# Patient Record
Sex: FEMALE | Race: BLACK OR AFRICAN AMERICAN | NOT HISPANIC OR LATINO | Employment: FULL TIME | URBAN - METROPOLITAN AREA
[De-identification: names, ages, dates, MRNs, and addresses within clinical notes are randomized per-mention and may not be internally consistent; named-entity substitution may affect disease eponyms.]

---

## 2017-07-19 ENCOUNTER — HOSPITAL ENCOUNTER (EMERGENCY)
Facility: HOSPITAL | Age: 47
Discharge: HOME | End: 2017-07-19
Attending: EMERGENCY MEDICINE
Payer: COMMERCIAL

## 2017-07-19 VITALS
HEART RATE: 57 BPM | RESPIRATION RATE: 17 BRPM | TEMPERATURE: 99 F | HEIGHT: 66 IN | OXYGEN SATURATION: 100 % | WEIGHT: 285 LBS | DIASTOLIC BLOOD PRESSURE: 65 MMHG | BODY MASS INDEX: 45.8 KG/M2 | SYSTOLIC BLOOD PRESSURE: 147 MMHG

## 2017-07-19 DIAGNOSIS — R07.89 CHEST WALL PAIN: Primary | ICD-10-CM

## 2017-07-19 DIAGNOSIS — R07.9 CHEST PAIN: ICD-10-CM

## 2017-07-19 LAB
ALBUMIN SERPL BCP-MCNC: 3.3 G/DL
ALP SERPL-CCNC: 79 U/L
ALT SERPL W/O P-5'-P-CCNC: 13 U/L
ANION GAP SERPL CALC-SCNC: 9 MMOL/L
AST SERPL-CCNC: 13 U/L
BASOPHILS # BLD AUTO: 0.03 K/UL
BASOPHILS NFR BLD: 0.3 %
BILIRUB SERPL-MCNC: 0.2 MG/DL
BILIRUB UR QL STRIP: NEGATIVE
BUN SERPL-MCNC: 16 MG/DL
CALCIUM SERPL-MCNC: 9 MG/DL
CHLORIDE SERPL-SCNC: 108 MMOL/L
CLARITY UR REFRACT.AUTO: CLEAR
CO2 SERPL-SCNC: 22 MMOL/L
COLOR UR AUTO: YELLOW
CREAT SERPL-MCNC: 0.8 MG/DL
DIFFERENTIAL METHOD: ABNORMAL
EOSINOPHIL # BLD AUTO: 0.1 K/UL
EOSINOPHIL NFR BLD: 1.5 %
ERYTHROCYTE [DISTWIDTH] IN BLOOD BY AUTOMATED COUNT: 13 %
EST. GFR  (AFRICAN AMERICAN): >60 ML/MIN/1.73 M^2
EST. GFR  (NON AFRICAN AMERICAN): >60 ML/MIN/1.73 M^2
GLUCOSE SERPL-MCNC: 102 MG/DL
GLUCOSE UR QL STRIP: NEGATIVE
HCT VFR BLD AUTO: 35.3 %
HGB BLD-MCNC: 11.8 G/DL
HGB UR QL STRIP: NEGATIVE
KETONES UR QL STRIP: NEGATIVE
LEUKOCYTE ESTERASE UR QL STRIP: NEGATIVE
LYMPHOCYTES # BLD AUTO: 3.3 K/UL
LYMPHOCYTES NFR BLD: 35.3 %
MCH RBC QN AUTO: 29.4 PG
MCHC RBC AUTO-ENTMCNC: 33.4 %
MCV RBC AUTO: 88 FL
MONOCYTES # BLD AUTO: 0.5 K/UL
MONOCYTES NFR BLD: 5.3 %
NEUTROPHILS # BLD AUTO: 5.4 K/UL
NEUTROPHILS NFR BLD: 57.5 %
NITRITE UR QL STRIP: NEGATIVE
PH UR STRIP: 7 [PH] (ref 5–8)
PLATELET # BLD AUTO: 290 K/UL
PMV BLD AUTO: 9 FL
POTASSIUM SERPL-SCNC: 3.8 MMOL/L
PROT SERPL-MCNC: 7.6 G/DL
PROT UR QL STRIP: NEGATIVE
RBC # BLD AUTO: 4.02 M/UL
SODIUM SERPL-SCNC: 139 MMOL/L
SP GR UR STRIP: 1.02 (ref 1–1.03)
TROPONIN I SERPL DL<=0.01 NG/ML-MCNC: <0.006 NG/ML
URN SPEC COLLECT METH UR: NORMAL
UROBILINOGEN UR STRIP-ACNC: NEGATIVE EU/DL
WBC # BLD AUTO: 9.3 K/UL

## 2017-07-19 PROCEDURE — 25000003 PHARM REV CODE 250: Performed by: PHYSICIAN ASSISTANT

## 2017-07-19 PROCEDURE — 96374 THER/PROPH/DIAG INJ IV PUSH: CPT

## 2017-07-19 PROCEDURE — 63600175 PHARM REV CODE 636 W HCPCS: Performed by: PHYSICIAN ASSISTANT

## 2017-07-19 PROCEDURE — 93005 ELECTROCARDIOGRAM TRACING: CPT

## 2017-07-19 PROCEDURE — 84484 ASSAY OF TROPONIN QUANT: CPT

## 2017-07-19 PROCEDURE — 81003 URINALYSIS AUTO W/O SCOPE: CPT

## 2017-07-19 PROCEDURE — 80053 COMPREHEN METABOLIC PANEL: CPT

## 2017-07-19 PROCEDURE — 93010 ELECTROCARDIOGRAM REPORT: CPT | Mod: ,,, | Performed by: INTERNAL MEDICINE

## 2017-07-19 PROCEDURE — 99284 EMERGENCY DEPT VISIT MOD MDM: CPT | Mod: 25

## 2017-07-19 PROCEDURE — 85025 COMPLETE CBC W/AUTO DIFF WBC: CPT

## 2017-07-19 PROCEDURE — 99285 EMERGENCY DEPT VISIT HI MDM: CPT | Mod: ,,, | Performed by: PHYSICIAN ASSISTANT

## 2017-07-19 RX ORDER — KETOROLAC TROMETHAMINE 30 MG/ML
10 INJECTION, SOLUTION INTRAMUSCULAR; INTRAVENOUS
Status: COMPLETED | OUTPATIENT
Start: 2017-07-19 | End: 2017-07-19

## 2017-07-19 RX ORDER — NAPROXEN 500 MG/1
500 TABLET ORAL 2 TIMES DAILY
Qty: 30 TABLET | Refills: 0 | Status: SHIPPED | OUTPATIENT
Start: 2017-07-19

## 2017-07-19 RX ADMIN — ALUMINUM HYDROXIDE, MAGNESIUM HYDROXIDE, AND SIMETHICONE 50 ML: 200; 200; 20 SUSPENSION ORAL at 02:07

## 2017-07-19 RX ADMIN — KETOROLAC TROMETHAMINE 10 MG: 30 INJECTION, SOLUTION INTRAMUSCULAR at 03:07

## 2017-07-19 NOTE — DISCHARGE INSTRUCTIONS
Take naproxen twice daily as needed for chest wall pain  Followup with PCP  Return to the ED for any new symptoms

## 2017-07-19 NOTE — PROVIDER PROGRESS NOTES - EMERGENCY DEPT.
Encounter Date: 7/19/2017    ED Physician Progress Notes         EKG - STEMI Decision  Initial Reading: No STEMI present.  Response: No Action Needed.

## 2017-07-19 NOTE — ED PROVIDER NOTES
Encounter Date: 2017       History     Chief Complaint   Patient presents with    Chest Pain     Left sided since this am     Morbidly obese left side of her chest the past 36 hours.  Pain was acute in onset when she woke up yesterday.  The pain is nonradiating and described as pulsating patient feels like the pain is coming from the left side her breast. Pain is worse with twisting of her torso and improved she supports her left breast.  She has not tried to take any medication for this pain.  She has never had this type of pain before.  She denies any trauma or injury.  She has no shortness of breath fever or chills.          Review of patient's allergies indicates:  No Known Allergies  Past Medical History:   Diagnosis Date    Varicose veins     wihtout ulcers     Past Surgical History:   Procedure Laterality Date     SECTION      x 3     Family History   Problem Relation Age of Onset    Diabetes Mother     Diabetes Father     Kidney disease Sister     Kidney disease Brother      Social History   Substance Use Topics    Smoking status: Never Smoker    Smokeless tobacco: Not on file    Alcohol use Yes     Review of Systems   Constitutional: Negative for fever.   HENT: Negative for sore throat.    Respiratory: Negative for shortness of breath.    Cardiovascular: Positive for chest pain.   Gastrointestinal: Negative for nausea and vomiting.   Genitourinary: Negative for dysuria.   Musculoskeletal: Negative for back pain.   Skin: Negative for rash.   Neurological: Negative for weakness.   Hematological: Does not bruise/bleed easily.       Physical Exam     Initial Vitals [17 0200]   BP Pulse Resp Temp SpO2   (!) 175/80 75 18 98.7 °F (37.1 °C) 97 %      MAP       111.67         Physical Exam    Constitutional: Vital signs are normal. She appears well-developed and well-nourished. She is not diaphoretic. No distress.   HENT:   Head: Normocephalic and atraumatic.   Right Ear: External ear  normal.   Left Ear: External ear normal.   Eyes: Conjunctivae are normal.   Cardiovascular: Normal rate and regular rhythm. Exam reveals no gallop and no friction rub.    No murmur heard.  Pulmonary/Chest: Breath sounds normal. No respiratory distress. She has no wheezes. She has no rhonchi. She has no rales. She exhibits tenderness.   Pain is reproduced when I press on the left lateral chest wall.   Mild epigastric tenderness on exam   Abdominal: Soft. Normal appearance, normal aorta and bowel sounds are normal. She exhibits no distension and no mass. There is no tenderness. There is no rebound and no guarding.   Musculoskeletal: Normal range of motion.   Neurological: She is alert and oriented to person, place, and time.   Skin: Skin is warm and intact.   Psychiatric: She has a normal mood and affect. Her speech is normal and behavior is normal. Cognition and memory are normal.         ED Course   Procedures  Labs Reviewed   CBC W/ AUTO DIFFERENTIAL - Abnormal; Notable for the following:        Result Value    Hemoglobin 11.8 (*)     Hematocrit 35.3 (*)     MPV 9.0 (*)     All other components within normal limits   COMPREHENSIVE METABOLIC PANEL - Abnormal; Notable for the following:     CO2 22 (*)     Albumin 3.3 (*)     All other components within normal limits   URINALYSIS   TROPONIN I     EKG Readings: (Independently Interpreted)   Initial Reading: No STEMI.   EKG: Normal sinus at 71 with no ST elevations or depressions.  No intervals, normal axis.       X-Rays:   Independently Interpreted Readings:   Other Readings:  Chest x-ray: No acute process    Medical Decision Making:   History:   Old Medical Records: I decided to obtain old medical records.  Old Records Summarized: records from previous admission(s).  Differential Diagnosis:   ACS, MI, pneumonia, muscle strain, costochondritis  Clinical Tests:   Lab Tests: Ordered and Reviewed  Radiological Study: Ordered and Reviewed  Medical Tests: Ordered and  Reviewed  ED Management:  47-year-old female with chest wall pain.  Pain improved with Toradol.  Cardiac evaluation in the ER reveals no acute findings.  Patient's pain has been present for greater than 36 hours.  Patient will be discharged home to follow-up in clinic with anti-inflammatory medications.  Return instructions given              Attending Attestation:     Physician Attestation Statement for NP/PA:   I discussed this assessment and plan of this patient with the NP/PA, but I did not personally examine the patient. The face to face encounter was performed by the NP/PA.    Other NP/PA Attestation Additions:      Medical Decision Makin-year-old female presenting with left-sided chest pain.  Pain is reproducible.  Initial EKG and troponin negative.  I considered cardiac etiologies, but the patient's symptoms have been persistent for over 36 hours with negative workup at this time.  She'll be discharged to follow-up with PCP for further evaluation                 ED Course     Clinical Impression:   The primary encounter diagnosis was Chest wall pain. A diagnosis of Chest pain was also pertinent to this visit.    Disposition:   Disposition: Discharged  Condition: Stable                        Joey Kunz PA-C  [id number]       Molly Mccarthy MD  [id number]       Molly Mccarthy MD  [id number]

## 2020-03-17 ENCOUNTER — CLINICAL SUPPORT (OUTPATIENT)
Dept: REHABILITATION | Facility: HOSPITAL | Age: 50
End: 2020-03-17
Payer: COMMERCIAL

## 2020-03-17 DIAGNOSIS — S92.215A CLOSED NONDISPLACED FRACTURE OF CUBOID BONE OF LEFT FOOT: ICD-10-CM

## 2020-03-17 DIAGNOSIS — R52 PAIN: ICD-10-CM

## 2020-03-17 DIAGNOSIS — S92.215S CLOSED NONDISPLACED FRACTURE OF CUBOID OF LEFT FOOT, SEQUELA: ICD-10-CM

## 2020-03-17 PROCEDURE — 97110 THERAPEUTIC EXERCISES: CPT | Performed by: PHYSICAL THERAPIST

## 2020-03-17 PROCEDURE — 97161 PT EVAL LOW COMPLEX 20 MIN: CPT | Performed by: PHYSICAL THERAPIST
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
## 2020-03-17 NOTE — PATIENT INSTRUCTIONS
Strengthening: Quadriceps Set    Tighten muscles on top of thighs by pushing knees down into surface. Hold 5 seconds.  Repeat 25 times . R/L or BL  leg per set. Do 1 sets per session. Do 2 sessions per day.      Straight Leg Raise     With R/L or BL leg straight, other leg bent, raise straight leg until knees are even. Slowly lower. Roll on your side and repeat lifting top leg up, on other side, lifting bottom leg up, and on stomach kicking back (squeezing your rear end before you kick back).  Repeat 25 times. Do 1 sets per session. Do 2 sessions per day.       Glute Squeeze, supine    Lie face up and squeeze your rear end. Do not tighten your abdominals or hold your breath. Squeeze our glutes and hold 5 seconds. Relax.  Repeat 25 times per set. Do 1 sets per session. Do 2 sessions per day.      Strengthening: Hip Adduction - Isometric    Sit back or lie down with ball or folded pillow between knees, and squeeze knees together. Hold 5 seconds.  Repeat 25 times per set. Do 1 sets per session. Do 2 sessions per day.        Hamstring Curl        Perform 25 times, 2x per day. (HEP to GO)        Ankle Pump    With affected leg elevated, gently flex and extend ankle. Move through full range of motion. Avoid pain. Perform more frequently if having increased swelling.  Repeat 25 times. Do 1 set per session. Do 2 sessions per day.      Ankle Alphabet    Sit with leg straight out in front of you and heel off edge of bed or propped up on towel roll. Using ankle and foot only, trace the letters of the alphabet. Perform A to Z. Do not let the knee move too much side to side.  Repeat 1 times on affected side Do 2 sessions per day.      Towel Scrunches    Place affected foot flat on towel, knee pointed forward. Use forefoot and toes to pull towel backward.  Do not allow heel or knee to move. Repetitions should be slow and controlled.  Repeat 25 times Do 1 set per session. Do 2 sessions per day.      Gastroc, Sitting (Passive)      Sit with leg straight out in front of you and a towel under your heel and around ball of foot. Gently pull toward body. Hold 30 seconds.   Repeat 5 times. Do 1 sets per session. Do 2 sessions per day.        Resistance Band Ankle Movements, 4 ways    1. Wrap band around foot and attach below the foot. Pull foot up against resistance band. Slowly release for 3-5 seconds.  2. Wrap band around foot and hold band in your hand. Push foot down against resistance band. Slowly release for 3-5 seconds.  3. Wrap band around foot and loop around other foot and hold the end of the band. Pull foot out to side against resistance band. Slowly release for 3-5 seconds.   4. Cross one leg over the other, wrap band around bottom foot, step top of foot on band and hold the end of the band. Pull foot to the inside against resistance band. Slowly release for 3-5 seconds.     Use  resistance band.  Repeat 25 times Do 1 set per session. Do 2 sessions per day.

## 2020-03-18 ENCOUNTER — TELEPHONE (OUTPATIENT)
Dept: REHABILITATION | Facility: HOSPITAL | Age: 50
End: 2020-03-18

## 2020-03-18 NOTE — TELEPHONE ENCOUNTER
Spoke with patient due to therapy following updates regarding COVID-19 closely and taking every precaution to ensure the safety of our patients, staff and community. In an abundance of caution and in an effort to help reduce risk and limit community spread, we have decided to temporarily postpone appointments for patients who may be at increased risk to attend in-person therapy or where therapy is not critically needed at this time. Plan of care and home exercise program were reviewed and patient has what they need to continue therapy at home. All patient questions were answered. Also stated to patient that we are exploring virtual methods of providing care and will be in touch over the next few weeks. Patient verbalized understanding to all.

## 2020-07-14 ENCOUNTER — CLINICAL SUPPORT (OUTPATIENT)
Dept: REHABILITATION | Facility: HOSPITAL | Age: 50
End: 2020-07-14
Payer: COMMERCIAL

## 2020-07-14 DIAGNOSIS — R52 PAIN: ICD-10-CM

## 2020-07-14 PROCEDURE — 97110 THERAPEUTIC EXERCISES: CPT | Performed by: PHYSICAL THERAPIST

## 2020-07-14 PROCEDURE — 97164 PT RE-EVAL EST PLAN CARE: CPT | Mod: 59 | Performed by: PHYSICAL THERAPIST

## 2020-07-14 PROCEDURE — 97140 MANUAL THERAPY 1/> REGIONS: CPT | Performed by: PHYSICAL THERAPIST

## 2020-07-14 NOTE — PROGRESS NOTES
Physical Therapy Daily Treatment Note     Name: Kathy Kennedy [facility] Number: [id number]    Therapy Diagnosis:   Encounter Diagnosis   Name Primary?    Pain      Physician: Ed Armstrong MD    Visit Date: 7/14/2020  Physician Orders: PT Eval and Treat   Medical Diagnosis: S92.215A (ICD-10-CM) - Closed nondisplaced fracture of cuboid bone of left foot   Evaluation Date: 3/17/2020  Authorization Period Expiration: 3-09-21  Plan of Care Certification Period: 10-30-20  Visit # / Visits authorized: 2/ 30    Time In: 2:50 pm  Time Out: 3:35 pm  Total Billable Time: 40 minutes    Precautions: Standard    Subjective     Pt reports: stopped PT due to pandemic.  Pt c/o ankle/foot pain with walking.  States doing HEP as instructed..  She was compliant with home exercise program.  Response to previous treatment: na  Functional change: na    Pain: 5/10  Location: left ankle     Objective     Gait is independent with a mild limp.  Flat feel B.  Ankle AROM is 5 deg DF, 45 PF, 15 inv and 10 ev.  Mild swelling ankle.  No TTP.  Gross strength is 4/5 ankle and hip abductors.    Kathy received therapeutic exercises to develop strength, ROM and flexibility for 30 minutes including:  HP x 7 min  MOBS/PROM x 8 min  GTB x 4 ways  heelcord stretch  HEP review  CP x 10 min      Home Exercises Provided and Patient Education Provided     Education provided:   - ice for pain/swelling    Written Home Exercises Provided: Patient instructed to cont prior HEP.  Exercises were reviewed and Kathy was able to demonstrate them prior to the end of the session.  Kathy demonstrated good  understanding of the education provided.     See EMR under Media for exercises provided prior visit.    Assessment     Tolerated treatment well.  Feel pt being obese is contributing to continued swelling.  Kathy is progressing well towards her goals.   Pt prognosis is Fair.     Pt will continue to benefit from skilled outpatient physical therapy to address the  deficits listed in the problem list box on initial evaluation, provide pt/family education and to maximize pt's level of independence in the home and community environment.     Pt's spiritual, cultural and educational needs considered and pt agreeable to plan of care and goals.    Anticipated barriers to physical therapy: none    Goals: Short Term Goals: 2 weeks         1.   Independent with HEP        2.  Pt will report decreased pain level of < 50% from above measure with ADL     Long Term Goals:   GOALS:    8_   weeks. Pt agrees with goals set.  1. Independent with HEP.  2. Report decreased    L ankle    pain  <   / =  3/10 with ADL such as walking  3. Increased MMT  for  L LE to 5/5  with ADL such as housework  4. Increased arom  for  L ankle to WFL-WNL with functional activities such walking or self-care      Plan     Continue PT per POC.    Tyler Andre, PT

## 2020-08-17 ENCOUNTER — CLINICAL SUPPORT (OUTPATIENT)
Dept: REHABILITATION | Facility: HOSPITAL | Age: 50
End: 2020-08-17
Attending: ORTHOPAEDIC SURGERY
Payer: COMMERCIAL

## 2020-08-17 DIAGNOSIS — R52 PAIN: ICD-10-CM

## 2020-08-17 PROCEDURE — 97110 THERAPEUTIC EXERCISES: CPT | Performed by: PHYSICAL THERAPIST

## 2020-08-17 NOTE — PROGRESS NOTES
Physical Therapy Daily Treatment Note     Name: Kathy Kennedy [facility] Number: [id number]    Therapy Diagnosis:   Encounter Diagnosis   Name Primary?    Pain      Physician: Ed Armstrong MD    Visit Date: 8/17/2020  Physician Orders: PT Eval and Treat   Medical Diagnosis: S92.215A (ICD-10-CM) - Closed nondisplaced fracture of cuboid bone of left foot   Evaluation Date: 3/17/2020  Authorization Period Expiration: 3-09-21  Plan of Care Certification Period: 10-30-20  Visit # / Visits authorized: 4/ 30    Time In: 1:30 pm  Time Out: 2:25 pm  Total Billable Time: 40 minutes    Precautions: Standard    Subjective     Pt reports: L ankle feels OK, pain with walking  She was compliant with home exercise program.  Response to previous treatment: na  Functional change: na    Pain: 4/10  Location: left ankle     Objective     Gait is independent with a mild limp.  Flat feel B.  Ankle AROM is 5 deg DF, 45 PF, 15 inv and 10 ev.  Mild swelling ankle.  No TTP.  Gross strength is 4/5 ankle and hip abductors.    Kathy received therapeutic exercises to develop strength, ROM and flexibility for 40 minutes including:  Bike x 7 min  MOBS/PROM x 8 min  GTB x 4 ways  heelcord stretch  Standing hip abd and ext with 3#  pilates [place] abd/add  HEP review  CP x 10 min      Home Exercises Provided and Patient Education Provided     Education provided:   - ice for pain/swelling    Written Home Exercises Provided: Patient instructed to cont prior HEP.  Exercises were reviewed and Kathy was able to demonstrate them prior to the end of the session.  Kathy demonstrated good  understanding of the education provided.     See EMR under Media for exercises provided prior visit.    Assessment     Tolerated treatment well.    Kathy is progressing well towards her goals.   Pt prognosis is Fair.     Pt will continue to benefit from skilled outpatient physical therapy to address the deficits listed in the problem list box on initial evaluation,  provide pt/family education and to maximize pt's level of independence in the home and community environment.     Pt's spiritual, cultural and educational needs considered and pt agreeable to plan of care and goals.    Anticipated barriers to physical therapy: none    Goals: Short Term Goals: 2 weeks         1.   Independent with HEP        2.  Pt will report decreased pain level of < 50% from above measure with ADL     Long Term Goals:   GOALS:    8_   weeks. Pt agrees with goals set.  1. Independent with HEP.  2. Report decreased    L ankle    pain  <   / =  3/10 with ADL such as walking  3. Increased MMT  for  L LE to 5/5  with ADL such as housework  4. Increased arom  for  L ankle to WFL-WNL with functional activities such walking or self-care      Plan     Continue PT per POC.    Tyler Andre, PT

## 2020-08-19 ENCOUNTER — CLINICAL SUPPORT (OUTPATIENT)
Dept: REHABILITATION | Facility: HOSPITAL | Age: 50
End: 2020-08-19
Payer: COMMERCIAL

## 2020-08-19 DIAGNOSIS — R52 PAIN: ICD-10-CM

## 2020-08-19 PROCEDURE — 97110 THERAPEUTIC EXERCISES: CPT | Performed by: PHYSICAL THERAPIST

## 2020-08-19 NOTE — PROGRESS NOTES
Physical Therapy Daily Treatment Note     Name: Kathy Kennedy [facility] Number: [id number]    Therapy Diagnosis:   Encounter Diagnosis   Name Primary?    Pain      Physician: Ed Armstrong MD    Visit Date: 8/19/2020  Physician Orders: PT Eval and Treat   Medical Diagnosis: S92.215A (ICD-10-CM) - Closed nondisplaced fracture of cuboid bone of left foot   Evaluation Date: 3/17/2020  Authorization Period Expiration: 3-09-21  Plan of Care Certification Period: 10-30-20  Visit # / Visits authorized: 5/ 30    Time In: 5:10 pm  Time Out: 6:00 pm  Total Billable Time: 40 minutes    Precautions: Standard    Subjective     Pt reports: L ankle/walk feel better  She was compliant with home exercise program.  Response to previous treatment: na  Functional change: na    Pain: 2/10  Location: left ankle     Objective     Gait is independent with a MIN limp.  Ankle AROM is 5 deg DF, 45 PF, 15 inv and 10 ev.  Mild swelling ankle.  No TTP.  Gross strength is 4/5 ankle and hip abductors.    Kathy received therapeutic exercises to develop strength, ROM and flexibility for 40 minutes including:  Bike x 7 min  MOBS/PROM x 8 min  GTB x 4 ways  heelcord stretch  Standing hip abd and ext with 3#  pilates [place] abd/add  Shuttle toe raises with 2 bands  HEP review  CP x 10 min      Home Exercises Provided and Patient Education Provided     Education provided:   - ice for pain/swelling    Written Home Exercises Provided: Patient instructed to cont prior HEP.  Exercises were reviewed and Kathy was able to demonstrate them prior to the end of the session.  Kathy demonstrated good  understanding of the education provided.     See EMR under Media for exercises provided prior visit.    Assessment     Tolerated treatment well.  No pain with treatment.  Kathy is progressing well towards her goals.   Pt prognosis is Fair.     Pt will continue to benefit from skilled outpatient physical therapy to address the deficits listed in the problem list  box on initial evaluation, provide pt/family education and to maximize pt's level of independence in the home and community environment.     Pt's spiritual, cultural and educational needs considered and pt agreeable to plan of care and goals.    Anticipated barriers to physical therapy: none    Goals: Short Term Goals: 2 weeks         1.   Independent with HEP        2.  Pt will report decreased pain level of < 50% from above measure with ADL     Long Term Goals:   GOALS:    8_   weeks. Pt agrees with goals set.  1. Independent with HEP.  2. Report decreased    L ankle    pain  <   / =  3/10 with ADL such as walking  3. Increased MMT  for  L LE to 5/5  with ADL such as housework  4. Increased arom  for  L ankle to WFL-WNL with functional activities such walking or self-care      Plan     Continue PT per POC.    Tyler Andre, PT

## 2020-08-25 ENCOUNTER — CLINICAL SUPPORT (OUTPATIENT)
Dept: REHABILITATION | Facility: HOSPITAL | Age: 50
End: 2020-08-25
Payer: COMMERCIAL

## 2020-08-25 DIAGNOSIS — R52 PAIN: ICD-10-CM

## 2020-08-25 PROCEDURE — 97110 THERAPEUTIC EXERCISES: CPT | Performed by: PHYSICAL THERAPIST

## 2020-08-25 NOTE — PROGRESS NOTES
Physical Therapy Daily Treatment Note     Name: Kathy Kennedy [facility] Number: [id number]    Therapy Diagnosis:   Encounter Diagnosis   Name Primary?    Pain      Physician: Ed Armstrong MD    Visit Date: 8/25/2020  Physician Orders: PT Eval and Treat   Medical Diagnosis: S92.215A (ICD-10-CM) - Closed nondisplaced fracture of cuboid bone of left foot   Evaluation Date: 3/17/2020  Authorization Period Expiration: 3-09-21  Plan of Care Certification Period: 10-30-20  Visit # / Visits authorized: 6/ 30    Time In: 10:45 am  Time Out: 11:35 am  Total Billable Time: 40 minutes    Precautions: Standard    Subjective     Pt reports: L ankle/walk feel sore today after walking a lot in [place] yesterday  She was compliant with home exercise program.  Response to previous treatment: less pain  Functional change: improved gait    Pain: 2/10  Location: left ankle     Objective     Gait is independent with a MIN limp.  Ankle AROM is 5 deg DF, 45 PF, 15 inv and 10 ev.  Mild swelling ankle.  No TTP.  Gross strength is 4/5 ankle and hip abductors.    Kathy received therapeutic exercises to develop strength, ROM and flexibility for 40 minutes including:  Bike x 7 min  MOBS/PROM x 10 min  GTB x 4 ways  heelcord stretch  Standing hip abd and ext with 3#  pilates [place] abd/add  Shuttle toe raises with 2.5 bands  HEP review  CP x 10 min      Home Exercises Provided and Patient Education Provided     Education provided:   - ice for pain/swelling    Written Home Exercises Provided: Patient instructed to cont prior HEP.  Exercises were reviewed and Kathy was able to demonstrate them prior to the end of the session.  Kathy demonstrated good  understanding of the education provided.     See EMR under Media for exercises provided prior visit.    Assessment     Tolerated treatment well.  Still exhibits some swelling with pain and weakness.    Kathy is progressing well towards her goals.   Pt prognosis is Fair.     Pt will continue to  benefit from skilled outpatient physical therapy to address the deficits listed in the problem list box on initial evaluation, provide pt/family education and to maximize pt's level of independence in the home and community environment.     Pt's spiritual, cultural and educational needs considered and pt agreeable to plan of care and goals.    Anticipated barriers to physical therapy: none    Goals: Short Term Goals: 2 weeks         1.   Independent with HEP        2.  Pt will report decreased pain level of < 50% from above measure with ADL     Long Term Goals:   GOALS:    8_   weeks. Pt agrees with goals set.  1. Independent with HEP.  2. Report decreased    L ankle    pain  <   / =  3/10 with ADL such as walking  3. Increased MMT  for  L LE to 5/5  with ADL such as housework  4. Increased arom  for  L ankle to WFL-WNL with functional activities such walking or self-care      Plan     Continue PT per POC.    Tyler Andre, PT

## 2020-08-26 ENCOUNTER — CLINICAL SUPPORT (OUTPATIENT)
Dept: REHABILITATION | Facility: HOSPITAL | Age: 50
End: 2020-08-26
Payer: COMMERCIAL

## 2020-08-26 DIAGNOSIS — R52 PAIN: ICD-10-CM

## 2020-08-26 PROCEDURE — 97140 MANUAL THERAPY 1/> REGIONS: CPT | Performed by: PHYSICAL THERAPIST

## 2020-08-26 PROCEDURE — 97110 THERAPEUTIC EXERCISES: CPT | Performed by: PHYSICAL THERAPIST

## 2020-08-26 NOTE — PROGRESS NOTES
Physical Therapy Daily Treatment Note     Name: Kathy Kennedy [facility] Number: [id number]    Therapy Diagnosis:   Encounter Diagnosis   Name Primary?    Pain      Physician: Ed Armstrong MD    Visit Date: 8/26/2020  Physician Orders: PT Eval and Treat   Medical Diagnosis: S92.215A (ICD-10-CM) - Closed nondisplaced fracture of cuboid bone of left foot   Evaluation Date: 3/17/2020  Authorization Period Expiration: 3-09-21  Plan of Care Certification Period: 10-30-20  Visit # / Visits authorized: 7/ 30    Time In: 4:00 pm  Time Out: 4:50 am  Total Billable Time: 40 minutes    Precautions: Standard    Subjective     Pt reports: L ankle/walk feel OK  She was compliant with home exercise program.  Response to previous treatment: less pain  Functional change: improved gait    Pain: 1/10  Location: left ankle     Objective     Gait is independent with a MIN limp.  Ankle AROM is 5 deg DF, 40 PF, 10 inv and 15 ev.  Mild swelling ankle.  TTP cuboid and lateral malleolus.  Limited ankle mobility.  Gross strength is 4/5 ankle and hip abductors.    Kathy received therapeutic exercises to develop strength, ROM and flexibility for 40 minutes including:  Bike x 10 min  MOBS/PROM x 10 min  GTB x 4 ways  heelcord stretch  Standing hip abd and ext with 3#  pilates [place] abd/add  Shuttle toe raises with 2.5 bands  HEP review  CP x 10 min      Home Exercises Provided and Patient Education Provided     Education provided:   - ice for pain/swelling    Written Home Exercises Provided: Patient instructed to cont prior HEP.  Exercises were reviewed and Kathy was able to demonstrate them prior to the end of the session.  Kathy demonstrated good  understanding of the education provided.     See EMR under Media for exercises provided prior visit.    Assessment     Tolerated treatment well.  Limited ankle mobility with decreased inv/ev.    Kathy is progressing well towards her goals.   Pt prognosis is Fair.     Pt will continue to benefit  from skilled outpatient physical therapy to address the deficits listed in the problem list box on initial evaluation, provide pt/family education and to maximize pt's level of independence in the home and community environment.     Pt's spiritual, cultural and educational needs considered and pt agreeable to plan of care and goals.    Anticipated barriers to physical therapy: none    Goals: Short Term Goals: 2 weeks         1.   Independent with HEP        2.  Pt will report decreased pain level of < 50% from above measure with ADL     Long Term Goals:   GOALS:    8_   weeks. Pt agrees with goals set.  1. Independent with HEP.  2. Report decreased    L ankle    pain  <   / =  3/10 with ADL such as walking  3. Increased MMT  for  L LE to 5/5  with ADL such as housework  4. Increased arom  for  L ankle to WFL-WNL with functional activities such walking or self-care      Plan     Continue PT per POC.    Tyler Andre, PT

## 2020-09-01 ENCOUNTER — CLINICAL SUPPORT (OUTPATIENT)
Dept: REHABILITATION | Facility: HOSPITAL | Age: 50
End: 2020-09-01
Payer: COMMERCIAL

## 2020-09-01 DIAGNOSIS — R52 PAIN: ICD-10-CM

## 2020-09-01 PROCEDURE — 97110 THERAPEUTIC EXERCISES: CPT | Performed by: PHYSICAL THERAPIST

## 2020-09-01 NOTE — PROGRESS NOTES
Physical Therapy Daily Treatment Note     Name: Kathy Kennedy [facility] Number: [id number]    Therapy Diagnosis:   Encounter Diagnosis   Name Primary?    Pain      Physician: Ed Armstrong MD    Visit Date: 9/1/2020  Physician Orders: PT Eval and Treat   Medical Diagnosis: S92.215A (ICD-10-CM) - Closed nondisplaced fracture of cuboid bone of left foot   Evaluation Date: 3/17/2020  Authorization Period Expiration: 3-09-21  Plan of Care Certification Period: 10-30-20  Visit # / Visits authorized: 8/ 30    Time In: 3:55 pm  Time Out: 4:50 am  Total Billable Time: 40 minutes    Precautions: Standard    Subjective     Pt reports: L ankle hurts today bc she's been walking alot  She was compliant with home exercise program.  Response to previous treatment: less pain  Functional change: improved gait    Pain: 6/10  Location: left ankle     Objective     Gait is independent with a mild limp.  Ankle AROM is 5 deg DF, 40 PF, 10 inv and 15 ev.  Mild swelling ankle.  TTP cuboid and lateral malleolus.  Limited ankle mobility.  Gross strength is 4/5 ankle and hip abductors.    Kathy received therapeutic exercises to develop strength, ROM and flexibility for 40 minutes including:  Bike x 10 min  MOBS/PROM x 10 min  GTB x 4 ways  heelcord stretch  Standing hip abd and ext with 3#  pilates [place] abd/add  Shuttle toe raises with 2.5 bands  GTR  HEP review  CP x 10 min      Home Exercises Provided and Patient Education Provided     Education provided:   - ice for pain/swelling    Written Home Exercises Provided: Patient instructed to cont prior HEP.  Exercises were reviewed and Kathy was able to demonstrate them prior to the end of the session.  Kathy demonstrated good  understanding of the education provided.     See EMR under Media for exercises provided prior visit.    Assessment     Tolerated treatment well.  Limited ankle mobility with decreased inv/ev.  Gait pattern improved with vc.    Kathy is progressing well towards her  goals.   Pt prognosis is Fair.     Pt will continue to benefit from skilled outpatient physical therapy to address the deficits listed in the problem list box on initial evaluation, provide pt/family education and to maximize pt's level of independence in the home and community environment.     Pt's spiritual, cultural and educational needs considered and pt agreeable to plan of care and goals.    Anticipated barriers to physical therapy: none    Goals: Short Term Goals: 2 weeks         1.   Independent with HEP        2.  Pt will report decreased pain level of < 50% from above measure with ADL     Long Term Goals:   GOALS:    8_   weeks. Pt agrees with goals set.  1. Independent with HEP.  2. Report decreased    L ankle    pain  <   / =  3/10 with ADL such as walking  3. Increased MMT  for  L LE to 5/5  with ADL such as housework  4. Increased arom  for  L ankle to WFL-WNL with functional activities such walking or self-care      Plan     Continue PT per POC.    Tyler Andre, PT

## 2020-09-03 ENCOUNTER — CLINICAL SUPPORT (OUTPATIENT)
Dept: REHABILITATION | Facility: HOSPITAL | Age: 50
End: 2020-09-03
Payer: COMMERCIAL

## 2020-09-03 DIAGNOSIS — R52 PAIN: ICD-10-CM

## 2020-09-03 PROCEDURE — 97140 MANUAL THERAPY 1/> REGIONS: CPT | Mod: CQ

## 2020-09-03 PROCEDURE — 97110 THERAPEUTIC EXERCISES: CPT | Mod: CQ

## 2020-09-03 NOTE — PROGRESS NOTES
"  Physical Therapy Daily Treatment Note     Name: Kathy Kennedy [facility] Number: [id number]    Therapy Diagnosis:   Encounter Diagnosis   Name Primary?    Pain      Physician: Ed Armstrong MD    Visit Date: 9/3/2020  Physician Orders: PT Eval and Treat   Medical Diagnosis: S92.215A (ICD-10-CM) - Closed nondisplaced fracture of cuboid bone of left foot   Evaluation Date: 3/17/2020  Authorization Period Expiration: 3-09-21  Plan of Care Certification Period: 10-30-20  Visit # / Visits authorized: 9/ 30    Time In: 1730  Time Out: 1815  Total Billable Time: 40 minutes    Precautions: Standard    Subjective     Pt reports: I've been walking a lot today and its hurting   She was compliant with home exercise program.  Response to previous treatment: less pain  Functional change: improved gait    Pain: 6/10  Location: left ankle     Objective     Gait is independent with a mild limp.  Min ankle swelling,  TTP cuboid and lateral malleolus.      Kathy received therapeutic exercises to develop strength, ROM and flexibility for 25 minutes including:  Bike x 10 min for increased circulation, mm strength/endurance and ROM  GTB x 4 ways 30x ea  Heelcord stretch w/strap 4x/30"  Standing heel raies     Standing hip abd and ext with 3#  pilates [place] abd/add 30x ea   Shuttle toe raises with 2.5 bands      Manual therapy to L ankle/foot for 10' including ankle mobs, stretching and PROM     CP x 10 min for decreased circulation, edema and pain       Home Exercises Provided and Patient Education Provided     Education provided:   - ice for pain/swelling    Written Home Exercises Provided: Patient instructed to cont prior HEP.  Exercises were reviewed and Kathy was able to demonstrate them prior to the end of the session.  Kathy demonstrated good  understanding of the education provided.     See EMR under Media for exercises provided prior visit.    Assessment     Tolerated treatment well with decreased pain after complete.  " Limited ankle mobility with decreased inv/ev. VC/TC for correcting form/technique with therex.     Kathy is progressing well towards her goals.   Pt prognosis is Fair.     Pt will continue to benefit from skilled outpatient physical therapy to address the deficits listed in the problem list box on initial evaluation, provide pt/family education and to maximize pt's level of independence in the home and community environment.     Pt's spiritual, cultural and educational needs considered and pt agreeable to plan of care and goals.    Anticipated barriers to physical therapy: none    Goals: Short Term Goals: 2 weeks         1.   Independent with HEP        2.  Pt will report decreased pain level of < 50% from above measure with ADL     Long Term Goals:   GOALS:    8_   weeks. Pt agrees with goals set.  1. Independent with HEP.  2. Report decreased    L ankle    pain  <   / =  3/10 with ADL such as walking  3. Increased MMT  for  L LE to 5/5  with ADL such as housework  4. Increased arom  for  L ankle to WFL-WNL with functional activities such walking or self-care      Plan     Continue PT per POC.    James Joe, PTA, STS

## 2020-09-08 ENCOUNTER — CLINICAL SUPPORT (OUTPATIENT)
Dept: REHABILITATION | Facility: HOSPITAL | Age: 50
End: 2020-09-08
Payer: COMMERCIAL

## 2020-09-08 DIAGNOSIS — R52 PAIN: ICD-10-CM

## 2020-09-08 PROCEDURE — 97110 THERAPEUTIC EXERCISES: CPT | Performed by: PHYSICAL THERAPIST

## 2020-09-08 PROCEDURE — 97140 MANUAL THERAPY 1/> REGIONS: CPT | Performed by: PHYSICAL THERAPIST

## 2020-09-08 NOTE — PROGRESS NOTES
Physical Therapy Daily Treatment Note     Name: Kathy Kennedy [facility] Number: [id number]    Therapy Diagnosis:   Encounter Diagnosis   Name Primary?    Pain      Physician: Ed Armstrong MD    Visit Date: 9/8/2020  Physician Orders: PT Eval and Treat   Medical Diagnosis: S92.215A (ICD-10-CM) - Closed nondisplaced fracture of cuboid bone of left foot   Evaluation Date: 3/17/2020  Authorization Period Expiration: 3-09-21  Plan of Care Certification Period: 10-30-20  Visit # / Visits authorized: 10/ 30    Time In: 4:25 pm  Time Out: 5:15 pm  Total Billable Time: 40 minutes    Precautions: Standard    Subjective     Pt reports: L ankle feels better  She was compliant with home exercise program.  Response to previous treatment: less pain  Functional change: improved gait    Pain: 0/10  Location: left ankle     Objective     Gait is independent with a MIN limp.  Ankle AROM is 5 deg DF, 40 PF, 10 inv and 15 ev.  Mild swelling ankle.  TTP cuboid and lateral malleolus.  Limited ankle mobility.  Gross strength is 4/5 ankle and hip abductors.    Kathy received therapeutic exercises to develop strength, ROM and flexibility for 40 minutes including:  Bike x 10 min  MOBS/PROM x 10 min  GTB x 4 ways  heelcord stretch  Standing hip abd and ext with 3#  pilates [place] abd/add  Shuttle toe raises with 2.5 bands  Lateral step ups  GTR  HEP review  CP x 10 min      Home Exercises Provided and Patient Education Provided     Education provided:   - ice for pain/swelling    Written Home Exercises Provided: Patient instructed to cont prior HEP.  Exercises were reviewed and Kathy was able to demonstrate them prior to the end of the session.  Kathy demonstrated good  understanding of the education provided.     See EMR under Media for exercises provided prior visit.    Assessment     Tolerated treatment well.  Limited ankle mobility with decreased inv/ev.  Gait pattern much improved.    Kathy is progressing well towards her goals.   Pt  prognosis is Fair.     Pt will continue to benefit from skilled outpatient physical therapy to address the deficits listed in the problem list box on initial evaluation, provide pt/family education and to maximize pt's level of independence in the home and community environment.     Pt's spiritual, cultural and educational needs considered and pt agreeable to plan of care and goals.    Anticipated barriers to physical therapy: none    Goals: Short Term Goals: 2 weeks         1.   Independent with HEP        2.  Pt will report decreased pain level of < 50% from above measure with ADL     Long Term Goals:   GOALS:    8_   weeks. Pt agrees with goals set.  1. Independent with HEP.  2. Report decreased    L ankle    pain  <   / =  3/10 with ADL such as walking  3. Increased MMT  for  L LE to 5/5  with ADL such as housework  4. Increased arom  for  L ankle to WFL-WNL with functional activities such walking or self-care      Plan     Continue PT per POC.    Tyler Andre, PT

## 2020-09-10 ENCOUNTER — CLINICAL SUPPORT (OUTPATIENT)
Dept: REHABILITATION | Facility: HOSPITAL | Age: 50
End: 2020-09-10
Payer: COMMERCIAL

## 2020-09-10 DIAGNOSIS — R52 PAIN: ICD-10-CM

## 2020-09-10 PROCEDURE — 97140 MANUAL THERAPY 1/> REGIONS: CPT | Mod: CQ

## 2020-09-10 PROCEDURE — 97110 THERAPEUTIC EXERCISES: CPT | Mod: CQ

## 2020-09-10 NOTE — PROGRESS NOTES
Physical Therapy Daily Treatment Note     Name: Kathy Kennedy [facility] Number: [id number]    Therapy Diagnosis:   Encounter Diagnosis   Name Primary?    Pain      Physician: Ed Armstrong MD    Visit Date: 9/10/2020  Physician Orders: PT Eval and Treat   Medical Diagnosis: S92.215A (ICD-10-CM) - Closed nondisplaced fracture of cuboid bone of left foot   Evaluation Date: 3/17/2020  Authorization Period Expiration: 3-09-21  Plan of Care Certification Period: 10-30-20  Visit # / Visits authorized: 9/ 30    Time In: 1655  Time Out: 1730  Total Billable Time: 40 minutes    Precautions: Standard    Subjective     Pt reports: lateral pain left ankle today.   She was compliant with home exercise program.  Response to previous treatment: less pain  Functional change: improved gait    Pain: 5/10  Location: left ankle     Objective   10' late for schedule tx.   Min limp present in gait, TTP lateral malleolus     Kathy received therapeutic exercises to develop strength, ROM and flexibility for 25 minutes including:  Bike x 10 min for increased circulation, mm strength/endurance and ROM  GTB x 4 ways 30x ea  [place] 10x   pilates [place] abd/add 30x ea   Standing hip abd 3# 30x   Standing heel raies 3# 30x     Manual therapy to L ankle/foot for 10' including ankle mobs, stretching and PROM     CP x 10 min for decreased circulation, edema and pain       Home Exercises Provided and Patient Education Provided     Education provided:   - ice for pain/swelling    Written Home Exercises Provided: Patient instructed to cont prior HEP.  Exercises were reviewed and Kathy was able to demonstrate them prior to the end of the session.  Kathy demonstrated good  understanding of the education provided.     See EMR under Media for exercises provided prior visit.    Assessment     Tolerated treatment well with decreased pain after complete.  Limited ankle mobility with decreased inv/ev. VC/TC for correcting form/technique with therex.      Kathy is progressing well towards her goals.   Pt prognosis is Fair.     Pt will continue to benefit from skilled outpatient physical therapy to address the deficits listed in the problem list box on initial evaluation, provide pt/family education and to maximize pt's level of independence in the home and community environment.     Pt's spiritual, cultural and educational needs considered and pt agreeable to plan of care and goals.    Anticipated barriers to physical therapy: none    Goals: Short Term Goals: 2 weeks         1.   Independent with HEP        2.  Pt will report decreased pain level of < 50% from above measure with ADL     Long Term Goals:   GOALS:    8_   weeks. Pt agrees with goals set.  1. Independent with HEP.  2. Report decreased    L ankle    pain  <   / =  3/10 with ADL such as walking  3. Increased MMT  for  L LE to 5/5  with ADL such as housework  4. Increased arom  for  L ankle to WFL-WNL with functional activities such walking or self-care      Plan     Continue PT per POC.    James Joe, PTA, STS

## 2020-09-15 ENCOUNTER — CLINICAL SUPPORT (OUTPATIENT)
Dept: REHABILITATION | Facility: HOSPITAL | Age: 50
End: 2020-09-15
Attending: ORTHOPAEDIC SURGERY
Payer: COMMERCIAL

## 2020-09-15 DIAGNOSIS — R52 PAIN: ICD-10-CM

## 2020-09-15 PROCEDURE — 97110 THERAPEUTIC EXERCISES: CPT | Performed by: PHYSICAL THERAPIST

## 2020-09-15 PROCEDURE — 97140 MANUAL THERAPY 1/> REGIONS: CPT | Performed by: PHYSICAL THERAPIST

## 2020-09-15 NOTE — PROGRESS NOTES
Physical Therapy Daily Treatment Note     Name: Kathy Kennedy [facility] Number: [id number]    Therapy Diagnosis:   Encounter Diagnosis   Name Primary?    Pain      Physician: Ed Armstrong MD    Visit Date: 9/15/2020  Physician Orders: PT Eval and Treat   Medical Diagnosis: S92.215A (ICD-10-CM) - Closed nondisplaced fracture of cuboid bone of left foot   Evaluation Date: 3/17/2020  Authorization Period Expiration: 3-09-21  Plan of Care Certification Period: 10-30-20  Visit # / Visits authorized: 12/ 30    Time In: 4:25 pm  Time Out: 5:15 pm  Total Billable Time: 40 minutes    Precautions: Standard    Subjective     Pt reports: L ankle feels better but still swells  She was compliant with home exercise program.  Response to previous treatment: less pain  Functional change: improved gait    Pain: 0/10  Location: left ankle     Objective     Gait is independent with a MIN limp.  Ankle AROM is 5 deg DF, 40 PF, 10 inv and 15 ev.  Mild swelling ankle.  TTP cuboid and lateral malleolus.  Limited ankle mobility.  Gross strength is 4/5 ankle and hip abductors.    Kathy received therapeutic exercises to develop strength, ROM and flexibility for 40 minutes including:  Bike x 10 min  MOBS/PROM x 10 min  GTB x 4 ways  heelcord stretch  Standing hip abd and ext with 3#  pilates [place] abd/add  Shuttle toe raises with 2.5 bands  Lateral step ups  GTR  HEP review  CP x 10 min      Home Exercises Provided and Patient Education Provided     Education provided:   - ice for pain/swelling    Written Home Exercises Provided: Patient instructed to cont prior HEP.  Exercises were reviewed and Kathy was able to demonstrate them prior to the end of the session.  Kathy demonstrated good  understanding of the education provided.     See EMR under Media for exercises provided prior visit.    Assessment     Tolerated treatment well.  Limited ankle mobility with decreased inv/ev.  Swelling persists.    Kathy is progressing well towards her  goals.   Pt prognosis is Fair.     Pt will continue to benefit from skilled outpatient physical therapy to address the deficits listed in the problem list box on initial evaluation, provide pt/family education and to maximize pt's level of independence in the home and community environment.     Pt's spiritual, cultural and educational needs considered and pt agreeable to plan of care and goals.    Anticipated barriers to physical therapy: none    Goals: Short Term Goals: 2 weeks         1.   Independent with HEP        2.  Pt will report decreased pain level of < 50% from above measure with ADL     Long Term Goals:   GOALS:    8_   weeks. Pt agrees with goals set.  1. Independent with HEP.  2. Report decreased    L ankle    pain  <   / =  3/10 with ADL such as walking  3. Increased MMT  for  L LE to 5/5  with ADL such as housework  4. Increased arom  for  L ankle to WFL-WNL with functional activities such walking or self-care      Plan     Continue PT per POC.    Tyler Andre, PT

## 2020-09-17 ENCOUNTER — CLINICAL SUPPORT (OUTPATIENT)
Dept: REHABILITATION | Facility: HOSPITAL | Age: 50
End: 2020-09-17
Attending: FAMILY MEDICINE
Payer: COMMERCIAL

## 2020-09-17 DIAGNOSIS — R52 PAIN: ICD-10-CM

## 2020-09-17 PROCEDURE — 97140 MANUAL THERAPY 1/> REGIONS: CPT | Mod: CQ

## 2020-09-17 PROCEDURE — 97110 THERAPEUTIC EXERCISES: CPT | Mod: CQ

## 2020-09-17 NOTE — PROGRESS NOTES
Physical Therapy Daily Treatment Note     Name: Kathy Kennedy [facility] Number: [id number]    Therapy Diagnosis:   Encounter Diagnosis   Name Primary?    Pain      Physician: Ed Armstrong MD    Visit Date: 9/17/2020  Physician Orders: PT Eval and Treat   Medical Diagnosis: S92.215A (ICD-10-CM) - Closed nondisplaced fracture of cuboid bone of left foot   Evaluation Date: 3/17/2020  Authorization Period Expiration: 3-09-21  Plan of Care Certification Period: 10-30-20  Visit # / Visits authorized: 13/ 30    Time In: 1645  Time Out: 1730  Total Billable Time: 40 minutes    Precautions: Standard    Subjective     Pt reports: L ankle feels better but still swells with extended standing  She was compliant with home exercise program.  Response to previous treatment: less pain  Functional change: improved gait    Pain: /10  Location: left ankle     Objective     Gait is independent with a MIN limp along with mild ankle swelling    Kathy received therapeutic exercises to develop strength, ROM and flexibility for 25 minutes including:    Bike x 10 min for increased ROM, circulation, and mm strength/endurance   Rocker board plantar/dorsi 30x  Rocker board wt shifting side/side 30x   Standing heel cord stretch 3x1'   6' step dorsiflexion 30x     Not today   heelcord stretch  Standing hip abd and ext with 3#  pilates [place] abd/add  Shuttle toe raises with 2.5 bands  Lateral step ups    Manual therapy to L ankle/foot for 10' including ankle mobs, stretching and PROM      CP x 10 min for decreased circulation, edema and pain         Home Exercises Provided and Patient Education Provided     Education provided:   - ice for pain/swelling    Written Home Exercises Provided: Patient instructed to cont prior HEP.  Exercises were reviewed and Kathy was able to demonstrate them prior to the end of the session.  Kathy demonstrated good  understanding of the education provided.     See EMR under Media for exercises provided prior  visit.    Assessment     Tolerated treatment well. Still with limited ankle mobility. Continue work on increased inversion and eversion. VC/TC for correcting form/techn[employer] performing therex. Edema still present along with TTP lateral malleolus      Kathy is progressing well towards her goals.   Pt prognosis is Fair.     Pt will continue to benefit from skilled outpatient physical therapy to address the deficits listed in the problem list box on initial evaluation, provide pt/family education and to maximize pt's level of independence in the home and communityo environment.     Pt's spiritual, cultural and educational needs considered and pt agreeable to plan of care and goals.    Anticipated barriers to physical therapy: none    Goals: Short Term Goals: 2 weeks         1.   Independent with HEP        2.  Pt will report decreased pain level of < 50% from above measure with ADL     Long Term Goals:   GOALS:    8_   weeks. Pt agrees with goals set.  1. Independent with HEP.  2. Report decreased    L ankle    pain  <   / =  3/10 with ADL such as walking  3. Increased MMT  for  L LE to 5/5  with ADL such as housework  4. Increased arom  for  L ankle to WFL-WNL with functional activities such walking or self-care      Plan     Continue PT per POC.    James Joe, PTA, STS

## 2020-09-22 ENCOUNTER — CLINICAL SUPPORT (OUTPATIENT)
Dept: REHABILITATION | Facility: HOSPITAL | Age: 50
End: 2020-09-22
Payer: COMMERCIAL

## 2020-09-22 DIAGNOSIS — R52 PAIN: ICD-10-CM

## 2020-09-22 PROCEDURE — 97110 THERAPEUTIC EXERCISES: CPT | Mod: CQ

## 2020-09-22 PROCEDURE — 97140 MANUAL THERAPY 1/> REGIONS: CPT | Mod: CQ

## 2020-09-22 NOTE — PROGRESS NOTES
"  Physical Therapy Daily Treatment Note     Name: Kathy Kennedy [facility] Number: [id number]    Therapy Diagnosis:   Encounter Diagnosis   Name Primary?    Pain      Physician: Ed Armstrong MD    Visit Date: 9/22/2020  Physician Orders: PT Eval and Treat   Medical Diagnosis: S92.215A (ICD-10-CM) - Closed nondisplaced fracture of cuboid bone of left foot   Evaluation Date: 3/17/2020  Authorization Period Expiration: 3-09-21  Plan of Care Certification Period: 10-30-20  Visit # / Visits authorized: 12/ 30    Time In: 1650  Time Out: 1740  Total Billable Time: 40 minutes    Precautions: Standard    Subjective     Pt reports: L ankle feels better but with lateral malleolus pain and medial swelling  She was compliant with home exercise program.  Response to previous treatment: less pain  Functional change: improved gait    Pain: 3/10  Location: left ankle     Objective     Gait is independent with a MIN limp along with mild ankle swelling    Kathy received therapeutic exercises to develop strength, ROM and flexibility for 30 minutes including:    Bike x 10 min for increased ROM, circulation, and mm strength/endurance   BAPS inv/ev 30x   OTB inv/ev 30x ea   OTB dorsi/plantar 30x   Foam pad L SLS 3x/30"  Rocker board plantar/dorsi 30x  Rocker board wt shifting side/side 30x       Not today   heelcord stretch  Standing hip abd and ext with 3#  pilates [place] abd/add  Shuttle toe raises with 2.5 bands  Lateral step ups    Manual therapy to L ankle/foot for 10' including ankle mobs, stretching and PROM      CP x 10 min for decreased circulation, edema and pain         Home Exercises Provided and Patient Education Provided     Education provided:   - ice for pain/swelling    Written Home Exercises Provided: Patient instructed to cont prior HEP.  Exercises were reviewed and Kathy was able to demonstrate them prior to the end of the session.  Kathy demonstrated good  understanding of the education provided.     See EMR under " Media for exercises provided prior visit.    Assessment     Tolerated treatment well. Still with limited with ankle inv/ev. Continue work on increased inversion and eversion. VC/TC for correcting form/techniqeu performing therex. Edema still present along with TTP lateral malleolus      Kathy is progressing well towards her goals.   Pt prognosis is Fair.     Pt will continue to benefit from skilled outpatient physical therapy to address the deficits listed in the problem list box on initial evaluation, provide pt/family education and to maximize pt's level of independence in the home and communityo environment.     Pt's spiritual, cultural and educational needs considered and pt agreeable to plan of care and goals.    Anticipated barriers to physical therapy: none    Goals: Short Term Goals: 2 weeks         1.   Independent with HEP        2.  Pt will report decreased pain level of < 50% from above measure with ADL     Long Term Goals:   GOALS:    8_   weeks. Pt agrees with goals set.  1. Independent with HEP.  2. Report decreased    L ankle    pain  <   / =  3/10 with ADL such as walking  3. Increased MMT  for  L LE to 5/5  with ADL such as housework  4. Increased arom  for  L ankle to WFL-WNL with functional activities such walking or self-care      Plan     Continue PT per POC.    James Joe, PTA, STS

## 2020-09-29 ENCOUNTER — CLINICAL SUPPORT (OUTPATIENT)
Dept: REHABILITATION | Facility: HOSPITAL | Age: 50
End: 2020-09-29
Attending: FAMILY MEDICINE
Payer: COMMERCIAL

## 2020-09-29 DIAGNOSIS — R52 PAIN: ICD-10-CM

## 2020-09-29 PROCEDURE — 97110 THERAPEUTIC EXERCISES: CPT | Performed by: PHYSICAL THERAPIST

## 2020-09-29 NOTE — PROGRESS NOTES
Physical Therapy Daily Treatment Note     Name: Kathy Kennedy [facility] Number: [id number]    Therapy Diagnosis:   Encounter Diagnosis   Name Primary?    Pain      Physician: Ed Armstrong MD    Visit Date: 9/29/2020  Physician Orders: PT Eval and Treat   Medical Diagnosis: S92.215A (ICD-10-CM) - Closed nondisplaced fracture of cuboid bone of left foot   Evaluation Date: 3/17/2020  Authorization Period Expiration: 3-09-21  Plan of Care Certification Period: 10-30-20  Visit # / Visits authorized: 15/ 30    Time In: 4:00 pm (15 min late for appt)  Time Out: 4:40 pm  Total Billable Time: 30 minutes    Precautions: Standard    Subjective     Pt reports: L ankle feels good today since I bought new shoes  She was compliant with home exercise program.  Response to previous treatment: less pain  Functional change: improved gait    Pain: 3/10  Location: left ankle     Objective     Gait is independent with a MIN limp.  Ankle AROM is 5 deg DF, 40 PF, 10 inv and 15 ev.  Mild swelling ankle.  TTP cuboid and lateral malleolus.  Limited ankle mobility.  Gross strength is 4/5 ankle and hip abductors.    Kathy received therapeutic exercises to develop strength, ROM and flexibility for 40 minutes including:  Bike x 10 min  GTB x 4 ways  heelcord stretch  Standing hip abd and ext with 3#  pilates [place] abd/add  Shuttle toe raises with 2.5 bands  Lateral step ups  GTR  HEP review  CP x 10 min      Home Exercises Provided and Patient Education Provided     Education provided:   - ice for pain/swelling    Written Home Exercises Provided: Patient instructed to cont prior HEP.  Exercises were reviewed and Kathy was able to demonstrate them prior to the end of the session.  Kathy demonstrated good  understanding of the education provided.     See EMR under Media for exercises provided prior visit.    Assessment     Tolerated treatment well.  Swelling persists.    Kathy is progressing well towards her goals.   Pt prognosis is Fair.      Pt will continue to benefit from skilled outpatient physical therapy to address the deficits listed in the problem list box on initial evaluation, provide pt/family education and to maximize pt's level of independence in the home and community environment.     Pt's spiritual, cultural and educational needs considered and pt agreeable to plan of care and goals.    Anticipated barriers to physical therapy: none    Goals: Short Term Goals: 2 weeks         1.   Independent with HEP        2.  Pt will report decreased pain level of < 50% from above measure with ADL     Long Term Goals:   GOALS:    8_   weeks. Pt agrees with goals set.  1. Independent with HEP.  2. Report decreased    L ankle    pain  <   / =  3/10 with ADL such as walking  3. Increased MMT  for  L LE to 5/5  with ADL such as housework  4. Increased arom  for  L ankle to WFL-WNL with functional activities such walking or self-care      Plan     Continue PT per POC.    Tyler Andre, PT

## 2020-10-01 ENCOUNTER — CLINICAL SUPPORT (OUTPATIENT)
Dept: REHABILITATION | Facility: HOSPITAL | Age: 50
End: 2020-10-01
Attending: OPHTHALMOLOGY
Payer: COMMERCIAL

## 2020-10-01 DIAGNOSIS — R52 PAIN: ICD-10-CM

## 2020-10-01 PROCEDURE — 97110 THERAPEUTIC EXERCISES: CPT | Mod: CQ

## 2020-10-01 NOTE — PROGRESS NOTES
"  Physical Therapy Daily Treatment Note     Name: Kathy Kennedy [facility] Number: [id number]    Therapy Diagnosis:   Encounter Diagnosis   Name Primary?    Pain      Physician: Ed Armstrong MD    Visit Date: 10/1/2020  Physician Orders: PT Eval and Treat   Medical Diagnosis: S92.215A (ICD-10-CM) - Closed nondisplaced fracture of cuboid bone of left foot   Evaluation Date: 3/17/2020  Authorization Period Expiration: 3-09-21  Plan of Care Certification Period: 10-30-20  Visit # / Visits authorized: 16/ 30    Time In: 1545  Time Out: 1640  Total Billable Time: 45 minutes    Precautions: Standard    Subjective     Pt reports: L ankle feels good, but a little tight today   She was compliant with home exercise program.  Response to previous treatment: less pain  Functional change: improved gait    Pain: 0/10  Location: left ankle     Objective     Kathy received therapeutic exercises to develop strength, ROM and flexibility for 45 minutes including:  Bike x 10 min for increase   heelcord stretch on slant 3x30"  Standing hip abd 3# 3x10  Standing hip ext 3# 3x10  Standing BAPS L ankle  30x 4 ways   GTB x 4 ways 30x ea   Standing calf raises 3x10/3"    CP x 10 min to L ankle for decreased circulation, pain, and edema    Home Exercises Provided and Patient Education Provided     Education provided:   - ice for pain/swelling    Written Home Exercises Provided: Patient instructed to cont prior HEP.  Exercises were reviewed and Kathy was able to demonstrate them prior to the end of the session.  Kathy demonstrated good  understanding of the education provided.     See EMR under Media for exercises provided prior visit.    Assessment     Tolerated treatment well but mm fatigue after complete. VC/TC for correcting form and technique with therex. Continue with swelling present.   Kathy is progressing well towards her goals.   Pt prognosis is Fair.     Pt will continue to benefit from skilled outpatient physical therapy to " address the deficits listed in the problem list box on initial evaluation, provide pt/family education and to maximize pt's level of independence in the home and community environment.     Pt's spiritual, cultural and educational needs considered and pt agreeable to plan of care and goals.    Anticipated barriers to physical therapy: none    Goals: Short Term Goals: 2 weeks         1.   Independent with HEP        2.  Pt will report decreased pain level of < 50% from above measure with ADL     Long Term Goals:   GOALS:    8_   weeks. Pt agrees with goals set.  1. Independent with HEP.  2. Report decreased    L ankle    pain  <   / =  3/10 with ADL such as walking  3. Increased MMT  for  L LE to 5/5  with ADL such as housework  4. Increased arom  for  L ankle to WFL-WNL with functional activities such walking or self-care      Plan     Continue PT per POC.    James Joe, PTA, STS